# Patient Record
Sex: FEMALE | Race: WHITE | Employment: PART TIME | URBAN - METROPOLITAN AREA
[De-identification: names, ages, dates, MRNs, and addresses within clinical notes are randomized per-mention and may not be internally consistent; named-entity substitution may affect disease eponyms.]

---

## 2019-04-04 PROBLEM — E11.9 TYPE 2 DIABETES MELLITUS WITHOUT COMPLICATION (HCC): Status: ACTIVE | Noted: 2019-04-04

## 2019-04-04 PROBLEM — K58.0 IRRITABLE BOWEL SYNDROME WITH DIARRHEA: Status: ACTIVE | Noted: 2018-09-04

## 2019-04-04 PROBLEM — J45.40 MODERATE PERSISTENT ASTHMA WITHOUT COMPLICATION: Status: ACTIVE | Noted: 2019-04-04

## 2019-04-04 PROBLEM — D50.0 IRON DEFICIENCY ANEMIA DUE TO CHRONIC BLOOD LOSS: Status: ACTIVE | Noted: 2019-04-04

## 2019-04-04 PROBLEM — L71.9 ROSACEA: Status: ACTIVE | Noted: 2019-04-04

## 2019-04-04 PROBLEM — F33.9 RECURRENT DEPRESSION (HCC): Status: ACTIVE | Noted: 2019-04-04

## 2019-04-04 PROBLEM — G60.9 IDIOPATHIC NEUROPATHY: Status: ACTIVE | Noted: 2019-04-04

## 2019-07-09 PROBLEM — K31.84 DIABETIC GASTROPARESIS (HCC): Status: ACTIVE | Noted: 2019-07-09

## 2019-07-09 PROBLEM — E11.43 DIABETIC GASTROPARESIS (HCC): Status: ACTIVE | Noted: 2019-07-09

## 2019-07-09 PROBLEM — E11.49 TYPE 2 DIABETES MELLITUS WITH NEUROLOGIC COMPLICATION (HCC): Status: ACTIVE | Noted: 2019-04-04

## 2019-07-11 PROBLEM — K31.84 GASTROPARESIS: Status: ACTIVE | Noted: 2019-07-11

## 2019-07-11 PROBLEM — E11.43 DIABETIC GASTROPARESIS (HCC): Status: RESOLVED | Noted: 2019-07-09 | Resolved: 2019-07-11

## 2019-07-11 PROBLEM — K31.84 DIABETIC GASTROPARESIS (HCC): Status: RESOLVED | Noted: 2019-07-09 | Resolved: 2019-07-11

## 2019-11-04 ENCOUNTER — HOSPITAL ENCOUNTER (OUTPATIENT)
Dept: MAMMOGRAPHY | Age: 54
Discharge: HOME | End: 2019-11-04
Attending: FAMILY MEDICINE
Payer: MEDICAID

## 2019-11-04 DIAGNOSIS — N63.0 BREAST LUMP OR MASS: ICD-10-CM

## 2019-11-04 PROCEDURE — 77066 DX MAMMO INCL CAD BI: CPT

## 2019-11-04 PROCEDURE — 76882 US LMTD JT/FCL EVL NVASC XTR: CPT

## 2019-11-04 NOTE — PROGRESS NOTES
Mammogram and ultrasound do not show any worrisome findings. There is a fatty tissue area of the breast but no other abnormal findings. Continue with annual mammogram screening in 1 year.